# Patient Record
Sex: MALE | Race: BLACK OR AFRICAN AMERICAN | URBAN - METROPOLITAN AREA
[De-identification: names, ages, dates, MRNs, and addresses within clinical notes are randomized per-mention and may not be internally consistent; named-entity substitution may affect disease eponyms.]

---

## 2019-03-16 ENCOUNTER — ED HISTORICAL/CONVERTED ENCOUNTER (OUTPATIENT)
Dept: OTHER | Age: 34
End: 2019-03-16

## 2024-03-19 ENCOUNTER — HOSPITAL ENCOUNTER (INPATIENT)
Facility: HOSPITAL | Age: 39
LOS: 2 days | Discharge: HOME | DRG: 753 | End: 2024-03-21
Attending: EMERGENCY MEDICINE | Admitting: PSYCHIATRY & NEUROLOGY
Payer: COMMERCIAL

## 2024-03-19 DIAGNOSIS — F19.90 POLYSUBSTANCE USE DISORDER: ICD-10-CM

## 2024-03-19 DIAGNOSIS — F31.9 BIPOLAR DEPRESSION (HCC): Primary | ICD-10-CM

## 2024-03-19 PROBLEM — Z86.59 HISTORY OF BIPOLAR DISORDER: Status: ACTIVE | Noted: 2024-03-19

## 2024-03-19 LAB
ALBUMIN SERPL-MCNC: 3.7 G/DL (ref 3.5–5)
ALBUMIN/GLOB SERPL: 0.9 (ref 1.1–2.2)
ALP SERPL-CCNC: 77 U/L (ref 45–117)
ALT SERPL-CCNC: 42 U/L (ref 12–78)
AMPHET UR QL SCN: NEGATIVE
ANION GAP SERPL CALC-SCNC: 7 MMOL/L (ref 5–15)
APAP SERPL-MCNC: <2 UG/ML (ref 10–30)
AST SERPL-CCNC: 36 U/L (ref 15–37)
BARBITURATES UR QL SCN: NEGATIVE
BASOPHILS # BLD: 0 K/UL (ref 0–0.1)
BASOPHILS NFR BLD: 0 % (ref 0–1)
BENZODIAZ UR QL: NEGATIVE
BILIRUB SERPL-MCNC: 0.4 MG/DL (ref 0.2–1)
BUN SERPL-MCNC: 10 MG/DL (ref 6–20)
BUN/CREAT SERPL: 9 (ref 12–20)
CALCIUM SERPL-MCNC: 9 MG/DL (ref 8.5–10.1)
CANNABINOIDS UR QL SCN: POSITIVE
CHLORIDE SERPL-SCNC: 107 MMOL/L (ref 97–108)
CO2 SERPL-SCNC: 25 MMOL/L (ref 21–32)
COCAINE UR QL SCN: POSITIVE
CREAT SERPL-MCNC: 1.16 MG/DL (ref 0.7–1.3)
DIFFERENTIAL METHOD BLD: NORMAL
EKG ATRIAL RATE: 84 BPM
EKG DIAGNOSIS: NORMAL
EKG P AXIS: 72 DEGREES
EKG P-R INTERVAL: 180 MS
EKG Q-T INTERVAL: 368 MS
EKG QRS DURATION: 88 MS
EKG QTC CALCULATION (BAZETT): 434 MS
EKG R AXIS: 69 DEGREES
EKG T AXIS: 43 DEGREES
EKG VENTRICULAR RATE: 84 BPM
EOSINOPHIL # BLD: 0.1 K/UL (ref 0–0.4)
EOSINOPHIL NFR BLD: 1 % (ref 0–7)
ERYTHROCYTE [DISTWIDTH] IN BLOOD BY AUTOMATED COUNT: 12.7 % (ref 11.5–14.5)
ETHANOL SERPL-MCNC: 16 MG/DL (ref 0–0.08)
FLUAV RNA SPEC QL NAA+PROBE: NOT DETECTED
FLUBV RNA SPEC QL NAA+PROBE: NOT DETECTED
GLOBULIN SER CALC-MCNC: 3.9 G/DL (ref 2–4)
GLUCOSE SERPL-MCNC: 89 MG/DL (ref 65–100)
HCT VFR BLD AUTO: 44 % (ref 36.6–50.3)
HGB BLD-MCNC: 14.5 G/DL (ref 12.1–17)
IMM GRANULOCYTES # BLD AUTO: 0 K/UL (ref 0–0.04)
IMM GRANULOCYTES NFR BLD AUTO: 0 % (ref 0–0.5)
LYMPHOCYTES # BLD: 2 K/UL (ref 0.8–3.5)
LYMPHOCYTES NFR BLD: 37 % (ref 12–49)
Lab: ABNORMAL
MCH RBC QN AUTO: 28.3 PG (ref 26–34)
MCHC RBC AUTO-ENTMCNC: 33 G/DL (ref 30–36.5)
MCV RBC AUTO: 85.8 FL (ref 80–99)
METHADONE UR QL: NEGATIVE
MONOCYTES # BLD: 0.3 K/UL (ref 0–1)
MONOCYTES NFR BLD: 6 % (ref 5–13)
NEUTS SEG # BLD: 3.1 K/UL (ref 1.8–8)
NEUTS SEG NFR BLD: 56 % (ref 32–75)
NRBC # BLD: 0 K/UL (ref 0–0.01)
NRBC BLD-RTO: 0 PER 100 WBC
OPIATES UR QL: NEGATIVE
PCP UR QL: NEGATIVE
PLATELET # BLD AUTO: 274 K/UL (ref 150–400)
PMV BLD AUTO: 8.9 FL (ref 8.9–12.9)
POTASSIUM SERPL-SCNC: 3.5 MMOL/L (ref 3.5–5.1)
PROT SERPL-MCNC: 7.6 G/DL (ref 6.4–8.2)
RBC # BLD AUTO: 5.13 M/UL (ref 4.1–5.7)
SALICYLATES SERPL-MCNC: <1.7 MG/DL (ref 2.8–20)
SARS-COV-2 RNA RESP QL NAA+PROBE: NOT DETECTED
SODIUM SERPL-SCNC: 139 MMOL/L (ref 136–145)
SPECIMEN HOLD: NORMAL
WBC # BLD AUTO: 5.5 K/UL (ref 4.1–11.1)

## 2024-03-19 PROCEDURE — 99285 EMERGENCY DEPT VISIT HI MDM: CPT

## 2024-03-19 PROCEDURE — 80307 DRUG TEST PRSMV CHEM ANLYZR: CPT

## 2024-03-19 PROCEDURE — 93005 ELECTROCARDIOGRAM TRACING: CPT | Performed by: EMERGENCY MEDICINE

## 2024-03-19 PROCEDURE — 90791 PSYCH DIAGNOSTIC EVALUATION: CPT

## 2024-03-19 PROCEDURE — 80143 DRUG ASSAY ACETAMINOPHEN: CPT

## 2024-03-19 PROCEDURE — 80053 COMPREHEN METABOLIC PANEL: CPT

## 2024-03-19 PROCEDURE — 6370000000 HC RX 637 (ALT 250 FOR IP): Performed by: NURSE PRACTITIONER

## 2024-03-19 PROCEDURE — 87636 SARSCOV2 & INF A&B AMP PRB: CPT

## 2024-03-19 PROCEDURE — 85025 COMPLETE CBC W/AUTO DIFF WBC: CPT

## 2024-03-19 PROCEDURE — 82077 ASSAY SPEC XCP UR&BREATH IA: CPT

## 2024-03-19 PROCEDURE — 36415 COLL VENOUS BLD VENIPUNCTURE: CPT

## 2024-03-19 PROCEDURE — 1240000000 HC EMOTIONAL WELLNESS R&B

## 2024-03-19 PROCEDURE — 80179 DRUG ASSAY SALICYLATE: CPT

## 2024-03-19 RX ORDER — DIPHENHYDRAMINE HYDROCHLORIDE 50 MG/ML
50 INJECTION INTRAMUSCULAR; INTRAVENOUS EVERY 4 HOURS PRN
Status: DISCONTINUED | OUTPATIENT
Start: 2024-03-19 | End: 2024-03-21 | Stop reason: HOSPADM

## 2024-03-19 RX ORDER — SENNOSIDES A AND B 8.6 MG/1
1 TABLET, FILM COATED ORAL DAILY PRN
Status: DISCONTINUED | OUTPATIENT
Start: 2024-03-19 | End: 2024-03-21 | Stop reason: HOSPADM

## 2024-03-19 RX ORDER — HALOPERIDOL 5 MG/ML
5 INJECTION INTRAMUSCULAR EVERY 4 HOURS PRN
Status: DISCONTINUED | OUTPATIENT
Start: 2024-03-19 | End: 2024-03-21 | Stop reason: HOSPADM

## 2024-03-19 RX ORDER — MAGNESIUM HYDROXIDE/ALUMINUM HYDROXICE/SIMETHICONE 120; 1200; 1200 MG/30ML; MG/30ML; MG/30ML
30 SUSPENSION ORAL EVERY 6 HOURS PRN
Status: DISCONTINUED | OUTPATIENT
Start: 2024-03-19 | End: 2024-03-21 | Stop reason: HOSPADM

## 2024-03-19 RX ORDER — TRAZODONE HYDROCHLORIDE 50 MG/1
50 TABLET ORAL NIGHTLY PRN
Status: DISCONTINUED | OUTPATIENT
Start: 2024-03-19 | End: 2024-03-21 | Stop reason: HOSPADM

## 2024-03-19 RX ORDER — ACETAMINOPHEN 325 MG/1
650 TABLET ORAL EVERY 4 HOURS PRN
Status: DISCONTINUED | OUTPATIENT
Start: 2024-03-19 | End: 2024-03-21 | Stop reason: HOSPADM

## 2024-03-19 RX ORDER — HYDROXYZINE 50 MG/1
50 TABLET, FILM COATED ORAL 3 TIMES DAILY PRN
Status: DISCONTINUED | OUTPATIENT
Start: 2024-03-19 | End: 2024-03-21 | Stop reason: HOSPADM

## 2024-03-19 RX ORDER — POLYETHYLENE GLYCOL 3350 17 G/17G
17 POWDER, FOR SOLUTION ORAL DAILY PRN
Status: DISCONTINUED | OUTPATIENT
Start: 2024-03-19 | End: 2024-03-21 | Stop reason: HOSPADM

## 2024-03-19 RX ORDER — HALOPERIDOL 5 MG/1
5 TABLET ORAL EVERY 4 HOURS PRN
Status: DISCONTINUED | OUTPATIENT
Start: 2024-03-19 | End: 2024-03-21 | Stop reason: HOSPADM

## 2024-03-19 RX ADMIN — ALUMINUM HYDROXIDE, MAGNESIUM HYDROXIDE, AND SIMETHICONE 30 ML: 1200; 120; 1200 SUSPENSION ORAL at 21:25

## 2024-03-19 RX ADMIN — HYDROXYZINE HYDROCHLORIDE 50 MG: 50 TABLET, FILM COATED ORAL at 21:25

## 2024-03-19 RX ADMIN — ACETAMINOPHEN 650 MG: 325 TABLET ORAL at 23:28

## 2024-03-19 RX ADMIN — TRAZODONE HYDROCHLORIDE 50 MG: 50 TABLET ORAL at 21:25

## 2024-03-19 ASSESSMENT — SLEEP AND FATIGUE QUESTIONNAIRES
AVERAGE NUMBER OF SLEEP HOURS: 4
DO YOU USE A SLEEP AID: YES
DO YOU HAVE DIFFICULTY SLEEPING: YES

## 2024-03-19 ASSESSMENT — LIFESTYLE VARIABLES
HOW MANY STANDARD DRINKS CONTAINING ALCOHOL DO YOU HAVE ON A TYPICAL DAY: 3 OR 4
HOW OFTEN DO YOU HAVE A DRINK CONTAINING ALCOHOL: 2-4 TIMES A MONTH

## 2024-03-19 ASSESSMENT — PAIN DESCRIPTION - DESCRIPTORS: DESCRIPTORS: ACHING;DISCOMFORT;SORE

## 2024-03-19 ASSESSMENT — PAIN - FUNCTIONAL ASSESSMENT
PAIN_FUNCTIONAL_ASSESSMENT: NONE - DENIES PAIN
PAIN_FUNCTIONAL_ASSESSMENT: ACTIVITIES ARE NOT PREVENTED

## 2024-03-19 ASSESSMENT — PAIN DESCRIPTION - LOCATION: LOCATION: GENERALIZED

## 2024-03-19 ASSESSMENT — PAIN SCALES - GENERAL: PAINLEVEL_OUTOF10: 6

## 2024-03-19 NOTE — PLAN OF CARE
Problem: Depression  Goal: Will be euthymic at discharge  Description: INTERVENTIONS:  1. Administer medication as ordered  2. Provide emotional support via 1:1 interaction with staff  3. Encourage involvement in milieu/groups/activities  4. Monitor for social isolation  Outcome: Progressing    Pt acclimating to the unit, eating meal in small dining room, interacting with select peers. Pt making needs known. He had no scheduled medications 3-7P and needed no prns. Encouraged to seek staff with any questions or concerns.

## 2024-03-19 NOTE — BSMART NOTE
Comprehensive Assessment Form Part 1      Section I - Disposition    Bipolar Disorder per patient report   Mild Depression per patient report   PTSD per patient report    No past medical history on file.     The Medical Doctor to Psychiatrist conference was notcompleted.  The Medical Doctor is in agreement with Psychiatrist disposition because of (reason) pt reported SI with a plan, meets criteria for admission.  The plan is admit to inpatient BHU  The on-call Psychiatrist consulted was Dr. Dhillon.  The admitting Psychiatrist will be Dr. TERRY.  The admitting Diagnosis is Bipolar Disorder.  The Payor source is not on file    This writer reviewed the [place] Suicide Severity Rating Scale in nursing flowsheet and the risk level assigned is high risk.  Based on this assessment, the risk of suicide is high risk and the plan is to admit to inpatient BHU.    Section II - Integrated Summary  Summary:  Per triage,  \"Pt to ED via EMS c/o SI. Pt states he plans to cut his wrist to end his life. Pt also reported to this nurse during triage that he would like to be tested for HIV. \"       Patient is a 38 year old male that arrived to the emergency room for suicidal ideations. This writer met with patient face to face. Introduced self and role. Pt was alert and oriented x4. Presented with depressed mood, flat affect and poor eye contact. When clinician asked patient what brought him to the hospital, he stated, \"I am having suicidal thoughts.\" Pt reported SI for the past week, triggered by recent relapse. Pt shared he was clean for about 4 months, until the holidays. Pt reported this was a trigger for him due to having no family support and loneliness. Pt shared he is homeless and feels like a failure. Pt stated, \"I am tired of life. I don't feel like it is worth it.\" Pt reports feeling like it would be better without him. Pt reported current SI with intention and plan to overdose on sleeping medication or cut his wrist. Pt reported

## 2024-03-19 NOTE — ED NOTES
ED SIGN OUT NOTE  Care assumed at [facility] 8:11 AM EDT    Osmel Montanez is a 38 y.o. male awaiting placement in a psychiatric facility with a diagnosis of   1. Bipolar depression (HCC)    2. Polysubstance use disorder    . The patient was reexamined and remains clinically stable. All needs are being met at this time.     Pt admitted to  unit    Patient Vitals for the past 24 hrs:   BP Temp Temp src Pulse Resp SpO2   03/19/24 1113 (!) 124/96 97.3 °F (36.3 °C) Oral 89 16 98 %   03/19/24 1055 (!) 141/85 98.2 °F (36.8 °C) Oral 85 16 95 %   03/19/24 0357 (!) 148/77 97.6 °F (36.4 °C) Oral 78 18 97 %       MD Tamar Del Rio Ryland, MD  03/19/24 8585

## 2024-03-19 NOTE — BSMART NOTE
Patient has been accepted to [facility] room/bed 746/01 by STEFANO Prado on behalf of Dr. Oliveira. NELI Cavazos notified and to call report.     David Carrion LCSW

## 2024-03-19 NOTE — ED TRIAGE NOTES
Pt to ED via EMS c/o SI. Pt states he plans to cut his wrist to end his life. Pt also reported to this nurse during triage that he would like to be tested for HIV.

## 2024-03-19 NOTE — BSMART NOTE
BSMART assessment completed, and suicide risk level noted to be high risk. Primary Nurse NELI Cavazos and Charge Nurse NELI Mcnally and Physician Dr. Boyle notified. Concerns not observed.     David Carrion LCSW

## 2024-03-19 NOTE — BH NOTE
TRANSFER - IN REPORT:    Verbal report received from NELI Reeves on Osmel Montanez  being received from [facility] ED for routine progression of patient care      Report consisted of patient’s Situation, Background, Assessment and   Recommendations(SBAR).     Information from the following report(s) ED SBAR was reviewed with the receiving nurse.    Opportunity for questions and clarification was provided.      Assessment completed upon patient’s arrival to unit and care assume

## 2024-03-19 NOTE — ED PROVIDER NOTES
[facility] EMERGENCY DEP  EMERGENCY DEPARTMENT ENCOUNTER      Pt Name: Osmel Montanez  MRN: [id number]  Birthdate 1985  Date of evaluation: 3/19/2024  Provider: Maritza Chilel MD    CHIEF COMPLAINT       Chief Complaint   Patient presents with    Mental Health Problem         HISTORY OF PRESENT ILLNESS   (Location/Symptom, Timing/Onset, Context/Setting, Quality, Duration, Modifying Factors, Severity)  Note limiting factors.   Patient is a 38-year-old who presents to the emergency department with increased depressive symptoms over the last week.  He reports that he is not taking his prescribed medications and instead has been medicating himself with crack cocaine, marijuana, and alcohol.  He used all 3 today and had the urge to cut his wrists with a razor blade.  He did not injure himself today.    The history is provided by the patient.         Review of External Medical Records:     Nursing Notes were reviewed.    REVIEW OF SYSTEMS    (2-9 systems for level 4, 10 or more for level 5)     Review of Systems    Except as noted above the remainder of the review of systems was reviewed and negative.       PAST MEDICAL HISTORY   No past medical history on file.      SURGICAL HISTORY     No past surgical history on file.      CURRENT MEDICATIONS       Previous Medications    No medications on file       ALLERGIES     Patient has no known allergies.    FAMILY HISTORY     No family history on file.       SOCIAL HISTORY       Social History     Socioeconomic History    Marital status: Single           PHYSICAL EXAM    (up to 7 for level 4, 8 or more for level 5)     ED Triage Vitals [03/19/24 0357]   BP Temp Temp Source Pulse Respirations SpO2 Height Weight   (!) 148/77 97.6 °F (36.4 °C) Oral 78 18 97 % -- --       There is no height or weight on file to calculate BMI.    Physical Exam  Vitals and nursing note reviewed.   Constitutional:       Appearance: He is well-developed.   HENT:      Head: Normocephalic and atraumatic.

## 2024-03-19 NOTE — BH NOTE
Pt arrived at 1115 via ED staff and security  Pt is a volutary admission  Dual skin assesment was performed by NELI Black and JONI Soares   Assessment was WDL. Tonio score is 23  Pt did consent to safety while on the unit   Pt did sign consent forms up on arriving   Denies suicidal ideation   Denies homicidal ideation   Does not appear to be responding to internal stimuli   UDS was positive for cocaine and THC     Pt was calm and cooperative, gave minimal answers during admission process. Will continue to monitor and support q15min     Patient denies SI and HI at this time.

## 2024-03-19 NOTE — BH NOTE
GROUP THERAPY PROGRESS NOTE    Patient did not participate in Healthy Living group.    Katherine Gillies, MSW, [facility]

## 2024-03-20 LAB
EKG ATRIAL RATE: 87 BPM
EKG DIAGNOSIS: NORMAL
EKG P AXIS: 71 DEGREES
EKG P-R INTERVAL: 170 MS
EKG Q-T INTERVAL: 352 MS
EKG QRS DURATION: 86 MS
EKG QTC CALCULATION (BAZETT): 423 MS
EKG R AXIS: 67 DEGREES
EKG T AXIS: 44 DEGREES
EKG VENTRICULAR RATE: 87 BPM

## 2024-03-20 PROCEDURE — 1240000000 HC EMOTIONAL WELLNESS R&B

## 2024-03-20 PROCEDURE — 93005 ELECTROCARDIOGRAM TRACING: CPT | Performed by: NURSE PRACTITIONER

## 2024-03-20 PROCEDURE — 6370000000 HC RX 637 (ALT 250 FOR IP): Performed by: NURSE PRACTITIONER

## 2024-03-20 RX ORDER — LOPERAMIDE HYDROCHLORIDE 2 MG/1
2 CAPSULE ORAL 4 TIMES DAILY PRN
Status: DISCONTINUED | OUTPATIENT
Start: 2024-03-20 | End: 2024-03-21 | Stop reason: HOSPADM

## 2024-03-20 RX ORDER — SIMETHICONE 80 MG
80 TABLET,CHEWABLE ORAL EVERY 6 HOURS PRN
Status: DISCONTINUED | OUTPATIENT
Start: 2024-03-20 | End: 2024-03-21 | Stop reason: HOSPADM

## 2024-03-20 RX ORDER — BUPROPION HYDROCHLORIDE 150 MG/1
150 TABLET ORAL DAILY
Status: DISCONTINUED | OUTPATIENT
Start: 2024-03-20 | End: 2024-03-21 | Stop reason: HOSPADM

## 2024-03-20 RX ORDER — LAMOTRIGINE 25 MG/1
25 TABLET ORAL DAILY
Status: DISCONTINUED | OUTPATIENT
Start: 2024-03-20 | End: 2024-03-21 | Stop reason: HOSPADM

## 2024-03-20 RX ORDER — QUETIAPINE FUMARATE 25 MG/1
50 TABLET, FILM COATED ORAL NIGHTLY
Status: DISCONTINUED | OUTPATIENT
Start: 2024-03-20 | End: 2024-03-21 | Stop reason: HOSPADM

## 2024-03-20 RX ORDER — IBUPROFEN 600 MG/1
600 TABLET ORAL EVERY 6 HOURS PRN
Status: DISCONTINUED | OUTPATIENT
Start: 2024-03-20 | End: 2024-03-21 | Stop reason: HOSPADM

## 2024-03-20 RX ADMIN — HYDROXYZINE HYDROCHLORIDE 50 MG: 50 TABLET, FILM COATED ORAL at 21:26

## 2024-03-20 RX ADMIN — IBUPROFEN 600 MG: 600 TABLET, FILM COATED ORAL at 21:26

## 2024-03-20 RX ADMIN — QUETIAPINE FUMARATE 50 MG: 25 TABLET ORAL at 21:19

## 2024-03-20 RX ADMIN — ACETAMINOPHEN 650 MG: 325 TABLET ORAL at 09:15

## 2024-03-20 RX ADMIN — ALUMINUM HYDROXIDE, MAGNESIUM HYDROXIDE, AND SIMETHICONE 30 ML: 1200; 120; 1200 SUSPENSION ORAL at 11:48

## 2024-03-20 ASSESSMENT — PAIN DESCRIPTION - DESCRIPTORS: DESCRIPTORS: ACHING

## 2024-03-20 ASSESSMENT — PAIN - FUNCTIONAL ASSESSMENT
PAIN_FUNCTIONAL_ASSESSMENT: PREVENTS OR INTERFERES SOME ACTIVE ACTIVITIES AND ADLS
PAIN_FUNCTIONAL_ASSESSMENT: ACTIVITIES ARE NOT PREVENTED
PAIN_FUNCTIONAL_ASSESSMENT: NONE - DENIES PAIN

## 2024-03-20 ASSESSMENT — PAIN SCALES - WONG BAKER: WONGBAKER_NUMERICALRESPONSE: NO HURT

## 2024-03-20 ASSESSMENT — PAIN DESCRIPTION - LOCATION
LOCATION: BACK
LOCATION: BACK

## 2024-03-20 ASSESSMENT — PAIN SCALES - GENERAL
PAINLEVEL_OUTOF10: 8
PAINLEVEL_OUTOF10: 4
PAINLEVEL_OUTOF10: 6
PAINLEVEL_OUTOF10: 0

## 2024-03-20 ASSESSMENT — PAIN DESCRIPTION - ORIENTATION: ORIENTATION: LOWER

## 2024-03-20 NOTE — BH NOTE
PRN Medication Documentation    Specific patient behavior that led to need for PRN medication: Pt c/o of anxiety  Staff interventions attempted prior to PRN being given: dim lights, rest  PRN medication given: Atarax 50 mg PO  Patient response/effectiveness of PRN medication: tl aware    Additionally, trazodone 50 mg PO given to promote sleep.

## 2024-03-20 NOTE — H&P
[facility] BEHAVIORAL HEALTH  INITIAL PSYCHIATRIC INTERVIEW:    Name: Osmel Montanez  MR#: [id number]  ACCOUNT#: [id number]  : 1985  ADMIT DATE: 3/19/2024    CHIEF COMPLAINT: \"I'm super depressed.\"     HISTORY OF PRESENTING COMPLAINT:  This is a 38 y.o. male who is currently admitted to the general psychiatric floor at [facility] on a voluntary basis for suicidal ideation with thoughts of cutting his wrists or overdosing. He is homeless and has been smoking THC and crack cocaine daily, as well as drinking 2-3 beers nightly.   Since arriving to the unit, the pt has exhibited agitation, has refused to participate in treatment team, has been argumentative with staff. He reports he's been struggling with depression over the past week. He identifies a relapse as well as homelessness as reasons for his admission. He was recently discharged from [facility] but did not follow up with any recommended care. He reports he normally takes Wellbutrin XL 300mg, Lamictal 50mg daily, and Seroquel, but has been off those for about a week.     PAST PSYCHIATRIC HISTORY: The pt has a hx of 8-10 psychiatric hospitalizations, mostly for depression, most recently at [facility] in . The pt has a hx of one past suicide attempt by cutting his wrist in . He is not currently connected with a therapist or psychiatrist.     PAST MEDICATION TRIALS: Lamictal, Buspar, hydroxyzine, trazodone, Wellbutrin, clonidine     SUBSTANCE ABUSE HISTORY:  Crack cocaine - 1-2 g daily   Etoh - 3-4 beers daily   THC daily     PSYCHOSOCIAL HISTORY: The pt is currently homeless. He is single and has no children. He has no access to firearms. Denies pending legal issues. Not working currently.     FAMILY HISTORY:   None that the pt is aware of     PAST MEDICAL HISTORY:   History reviewed. No pertinent past medical history.    ALLERGIES: NKDA    MENTAL STATUS EXAM:   38 y.o. male in moderate grooming, dressed in hospital gown,

## 2024-03-20 NOTE — INTERDISCIPLINARY ROUNDS
Behavioral Health Interdisciplinary Rounds     Patient Name: Osmel Montanez  Age: 38 y.o.  Room/Bed:  [facility]  Primary Diagnosis: History of bipolar disorder   Admission Status: Voluntary    Readmission within 30 days: No  Power of  in place: No  Patient requires a blocked bed: No          Reason for blocked bed:   Sleep hours:        Participation in Care/Groups:  New admit  Medication Compliant?: Yes  PRNS (last 24 hours): Antianxiety and Pain   Restraints (last 24 hours):  No  __________________________________________________  OQ Admission Analysis Survey completed:  OQ Admission Analysis Survey score:  __________________________________________________     Alcohol screening (AUDIT) completed -     If applicable, date SBIRT discussed in treatment team AND documented:    Tobacco - patient is a smoker:    Illegal Drugs use:      24 hour chart check complete: Yes    _______________________________________________    Patient goal(s) for today:   Treatment team focus/goals:   Progress note:      Spiritual Care Consult:   Financial concerns/prescription coverage:    Family contact:                        Family requesting physician contact today:    Discharge plan:   Access to weapons :                                                              Outpatient provider(s):   Patient's preferred phone number for follow up call :   Patient's preferred e-mail address :    LOS:  1  Expected LOS:     Participating treatment team members: Osmel Montanez, * (assigned SW),

## 2024-03-20 NOTE — PROGRESS NOTES
Laboratory Monitoring for Antipsychotics:    This patient is currently prescribed the following medication(s):   Current Facility-Administered Medications: buPROPion (WELLBUTRIN XL) extended release tablet 150 mg, 150 mg, Oral, Daily  lamoTRIgine (LAMICTAL) tablet 25 mg, 25 mg, Oral, Daily  QUEtiapine (SEROQUEL) tablet 50 mg, 50 mg, Oral, Nightly  ibuprofen (ADVIL;MOTRIN) tablet 600 mg, 600 mg, Oral, Q6H PRN  simethicone (MYLICON) chewable tablet 80 mg, 80 mg, Oral, Q6H PRN  loperamide (IMODIUM) capsule 2 mg, 2 mg, Oral, 4x Daily PRN  acetaminophen (TYLENOL) tablet 650 mg, 650 mg, Oral, Q4H PRN  polyethylene glycol (GLYCOLAX) packet 17 g, 17 g, Oral, Daily PRN  senna (SENOKOT) tablet 8.6 mg, 1 tablet, Oral, Daily PRN  aluminum & magnesium hydroxide-simethicone (MAALOX) 200-200-20 MG/5ML suspension 30 mL, 30 mL, Oral, Q6H PRN  hydrOXYzine HCl (ATARAX) tablet 50 mg, 50 mg, Oral, TID PRN  haloperidol (HALDOL) tablet 5 mg, 5 mg, Oral, Q4H PRN **OR** haloperidol lactate (HALDOL) injection 5 mg, 5 mg, IntraMUSCular, Q4H PRN  diphenhydrAMINE (BENADRYL) injection 50 mg, 50 mg, IntraMUSCular, Q4H PRN  traZODone (DESYREL) tablet 50 mg, 50 mg, Oral, Nightly PRN    The following labs have been completed for monitoring of antipsychotics and/or mood stabilizers:    Height, Weight, BMI Estimation  There is no height or weight on file to calculate BMI.     Vital Signs/Blood Pressure  /76   Pulse (!) 101   Temp 99.1 °F (37.3 °C) (Oral)   Resp 20   SpO2 98%     Renal Function, Hepatic Function and Chemistry  CrCl cannot be calculated (Unknown ideal weight.).    Lab Results   Component Value Date/Time     03/19/2024 04:15 AM    K 3.5 03/19/2024 04:15 AM     03/19/2024 04:15 AM    CO2 25 03/19/2024 04:15 AM    BUN 10 03/19/2024 04:15 AM    GLOB 3.9 03/19/2024 04:15 AM    ALT 42 03/19/2024 04:15 AM    AST 36 03/19/2024 04:15 AM       Glucose/Hemoglobin A1c  No results found for: \"GLU\", \"GLUCPOC\"  No results found

## 2024-03-20 NOTE — BH NOTE
GROUP THERAPY PROGRESS NOTE  Activity: Meditation   Pt did not participate in group led by nursing students.  Katherine Gillies, MSW, [facility]

## 2024-03-20 NOTE — BH NOTE
PSYCHOSOCIAL ASSESSMENT  :Patient identifying info:   Osmel Montanez is a 38 y.o., male admitted 3/19/2024  3:54 AM     Presenting problem and precipitating factors: Pt arrived to ED for SI triggered by recent relapse. Reports HI with no plan to \"someone in the streets.\" Hx of mild depression, reports he has been off of medication for a week. Pt seeing admission for SI with thoughts of cutting wrists or overdosing.    Mental status assessment: Overall mood is depressed and agitated    Strengths/Recreation/Coping Skills: Insured    Collateral information: None    Current psychiatric /substance abuse providers and contact info: None at this time    Previous psychiatric/substance abuse providers and response to treatment: Hx of 8-10 psych hospitalizations, mostly for depression. Most recently at [facility] in early     Family history of mental illness or substance abuse: None pt is aware of    Substance abuse history:    Social History     Tobacco Use    Smoking status: Not on file    Smokeless tobacco: Not on file   Substance Use Topics    Alcohol use: Not on file       History of biomedical complications associated with substance abuse: Not noted    Patient's current acceptance of treatment or motivation for change: Voluntary    Family constellation: Single, no children    Is significant other involved? No    Describe support system: Limited    Describe living arrangements and home environment: No stable housing, homeless    GUARDIAN/POA: No    Guardian Name:      Guardian Contact:      Health issues: History reviewed. No pertinent past medical history.    Trauma history: Not noted    Legal issues: None    History of  service: None    Financial status: Not noted    Voodoo/cultural factors: Not noted    Education/work history: Highest education achieved is GED, currently not employed    Have you been licensed as a health care professional (current or ): No    Describe coping skills:

## 2024-03-20 NOTE — CARE COORDINATION
03/20/24 1449   ITP   Date of Plan 03/20/24   Date of Next Review 03/27/24   Primary Diagnosis Code Bipolar   Barriers to Treatment Need for psychoeducation;Psychiatric symptom (comment)   Strengths Incorporated in Plan Acknowledging need for assistance   Plan of Care   Long Term Goal (LTG) Stated in patient/guardian terms Prticipate in outpt tx 12 wks   Short Term Goal 1   Short Term Goal 1 Client will be oriented to program and staff, and participate in assessment process   Baseline Functioning able to verbalize needs   Target 1 week   Objectives Client will participate in group therapy   Intervention 1 Acknowledge client strengths   Frequency daily   Measured by Staff observation;Self report;Behavioral data   Staff Responsible Clinical staff   Intervention 2 Group therapy   Frequency daily   Measured by Staff observation;Self report   Staff Responsible Clinical staff   Intervention 3 Monitor medications   Frequency daily   Measured by Staff observation;Behavioral data   Staff Responsible Clinical staff   STG Goal 1 Status: Patient Appears to be  Progressing toward treatment plan goal   Crisis/Safety/Discharge Plan   Crisis/Safety Plan Standard program interventions and protocol   Comprehensive Assessment Completion Date 03/20/24   Discharge Plan Pt to discharge home with follow up care

## 2024-03-20 NOTE — PLAN OF CARE
Problem: Depression  Goal: Will be euthymic at discharge  Description: INTERVENTIONS:  1. Administer medication as ordered  2. Provide emotional support via 1:1 interaction with staff  3. Encourage involvement in milieu/groups/activities  4. Monitor for social isolation  3/20/2024 0152 by Marino Mckeon, RN  Outcome: Progressing     Problem: Drug Abuse/Detox  Goal: Will have no detox symptoms and will verbalize plan for changing drug-related behavior  Description: INTERVENTIONS:  1. Administer medication as ordered  2. Monitor physical status  3. Provide emotional support with 1:1 interaction with staff  4. Encourage  recovery focused treatment   Outcome: Progressing

## 2024-03-20 NOTE — DISCHARGE INSTRUCTIONS
DISCHARGE SUMMARY    NAME:Osmel Montanez  : 1985  MRN: [id number]    The patient Osmel Montanez exhibits the ability to control behavior in a less restrictive environment.  Patient's level of functioning is improving.  No assaultive/destructive behavior has been observed for the past 24 hours.  No suicidal/homicidal threat or behavior has been observed for the past 24 hours.  There is no evidence of serious medication side effects.  Patient has not been in physical or protective restraints for at least the past 24 hours.    If weapons involved, how are they secured? None    Is patient aware of and in agreement with discharge plan? Yes    Arrangements for medication:  Prescriptions filled through [facility], 30 day supply provided    Copy of discharge instructions to provider?: Yes    Arrangements for transportation home: U     Keep all follow up appointments as scheduled, continue to take prescribed medications per physician instructions.  Mental health crisis number:  911 or your local mental health crisis line number at [facility] at [phone]      Mental Health Emergency WARM LINE      [phone] ([phone])      M-F: 9am to 9pm      Sat & Sun: 5pm - 9pm  National suicide prevention lines:                             9-575-RKTLGRY (1-601.625.4983)       1-776-313-TALK (1-351.177.6836)    Crisis Text Line:  Text HOME to 665771      STD TESTING INFORMATION     [facility] [place] [address] [place] [address]   Call the [facility] Call Center at [phone] any time between 8 am and 5 pm Monday through Friday to make a testing appointment!     Free HIV/STI Testing  [facility] ([address] ) offers free HIV, gonorrhea, and chlamydia testing.  You do not have to be a [facility] clinic patient to request testing.  Appointments can be made by email or phone ([phone] x [phone]).  [facility] ([address], [address], [phone]) offers free HIV,

## 2024-03-20 NOTE — PLAN OF CARE
Problem: Depression  Goal: Will be euthymic at discharge  Description: INTERVENTIONS:  1. Administer medication as ordered  2. Provide emotional support via 1:1 interaction with staff  3. Encourage involvement in milieu/groups/activities  4. Monitor for social isolation  3/20/2024 1615 by Gianna Eaton, LPN  Outcome: Progressing  Will continue to observe  for increased sx of depression

## 2024-03-20 NOTE — PLAN OF CARE
Problem: Depression  Goal: Will be euthymic at discharge  Description: INTERVENTIONS:  1. Administer medication as ordered  2. Provide emotional support via 1:1 interaction with staff  3. Encourage involvement in milieu/groups/activities  4. Monitor for social isolation  3/20/2024 0758 by Yuridia Concepcion RN  Outcome: Progressing  3/20/2024 0152 by Marino Mckeon RN  Outcome: Progressing     Problem: Drug Abuse/Detox  Goal: Will have no detox symptoms and will verbalize plan for changing drug-related behavior  Description: INTERVENTIONS:  1. Administer medication as ordered  2. Monitor physical status  3. Provide emotional support with 1:1 interaction with staff  4. Encourage  recovery focused treatment   3/20/2024 0758 by Yuridia Concepcion RN  Outcome: Progressing  3/20/2024 0152 by Marino Mckeon RN  Outcome: Progressing    1240: Patient is participatory in treatment team. Mood and affect; demanding, irritable, and labile. Patient needing frequent redirection on being appropriate and respectful while on the unit. Patient voices passive SI. Denies HI. Denies AH/VH. Plans are to restart medications patient was previously on. Patient has history of multiple psychiatric admissions/malingering. Plans are ongoing, patient voices no further complaints at this time.

## 2024-03-20 NOTE — BH NOTE
GROUP THERAPY PROGRESS NOTE    Patient did not participate in recreational therapy group.    Katherine Gillies, MSW, [facility]

## 2024-03-20 NOTE — BH NOTE
Patient was in room lying in bed, this writer asked patient twice within 5 mins to come to med room to take his medications.  Patient refused.

## 2024-03-21 VITALS
RESPIRATION RATE: 18 BRPM | HEART RATE: 101 BPM | TEMPERATURE: 97.9 F | SYSTOLIC BLOOD PRESSURE: 99 MMHG | DIASTOLIC BLOOD PRESSURE: 88 MMHG | OXYGEN SATURATION: 97 %

## 2024-03-21 PROCEDURE — 6370000000 HC RX 637 (ALT 250 FOR IP): Performed by: NURSE PRACTITIONER

## 2024-03-21 RX ORDER — TRAZODONE HYDROCHLORIDE 50 MG/1
50 TABLET ORAL NIGHTLY PRN
Qty: 30 TABLET | Refills: 0 | Status: SHIPPED | OUTPATIENT
Start: 2024-03-21

## 2024-03-21 RX ORDER — LAMOTRIGINE 25 MG/1
25 TABLET ORAL DAILY
Qty: 30 TABLET | Refills: 0 | Status: SHIPPED | OUTPATIENT
Start: 2024-03-22

## 2024-03-21 RX ORDER — BUPROPION HYDROCHLORIDE 150 MG/1
150 TABLET ORAL DAILY
Qty: 30 TABLET | Refills: 0 | Status: SHIPPED | OUTPATIENT
Start: 2024-03-22

## 2024-03-21 RX ORDER — HYDROXYZINE 50 MG/1
50 TABLET, FILM COATED ORAL 3 TIMES DAILY PRN
Qty: 30 TABLET | Refills: 0 | Status: SHIPPED | OUTPATIENT
Start: 2024-03-21 | End: 2024-03-31

## 2024-03-21 RX ORDER — QUETIAPINE FUMARATE 50 MG/1
50 TABLET, FILM COATED ORAL NIGHTLY
Qty: 60 TABLET | Refills: 0 | Status: SHIPPED | OUTPATIENT
Start: 2024-03-21

## 2024-03-21 RX ADMIN — BUPROPION HYDROCHLORIDE 150 MG: 150 TABLET, EXTENDED RELEASE ORAL at 08:54

## 2024-03-21 RX ADMIN — LAMOTRIGINE 25 MG: 25 TABLET ORAL at 08:54

## 2024-03-21 ASSESSMENT — PAIN SCALES - GENERAL: PAINLEVEL_OUTOF10: 0

## 2024-03-21 ASSESSMENT — PAIN SCALES - WONG BAKER: WONGBAKER_NUMERICALRESPONSE: NO HURT

## 2024-03-21 NOTE — BH NOTE
PRN Medication Documentation    Specific patient behavior that led to need for PRN medication: Pt c/o of anxiety  Staff interventions attempted prior to PRN being given: rest, coping skills   PRN medication given: Atarax 50 mg PO  Patient response/effectiveness of PRN medication: tl aware

## 2024-03-21 NOTE — BH NOTE
Behavioral Health Transition Record to Provider    Patient Name: Osmel Montanez  YOB: 1985   Medical Record Number: [id number]  Date of Admission: 3/19/2024  3:54 AM   Date of Discharge: 3/21/2024    Attending Provider: Alexi Oliveira MD   Discharging Provider: Chloe Prado NP    To contact this individual call [phone] and ask the  to page.  If unavailable, ask to be transferred to Behavioral Health Provider on call.  A Behavioral Health Provider will be available on call 24/7 and during holidays.    Primary Care Provider: No primary care provider on file.    No Known Allergies    Reason for Admission: CHIEF COMPLAINT: \"I'm super depressed.\"      HISTORY OF PRESENTING COMPLAINT:  This is a 38 y.o. male who is currently admitted to the general psychiatric floor at [facility] on a voluntary basis for suicidal ideation with thoughts of cutting his wrists or overdosing. He is homeless and has been smoking THC and crack cocaine daily, as well as drinking 2-3 beers nightly.   Since arriving to the unit, the pt has exhibited agitation, has refused to participate in treatment team, has been argumentative with staff. He reports he's been struggling with depression over the past week. He identifies a relapse as well as homelessness as reasons for his admission. He was recently discharged from [facility] but did not follow up with any recommended care. He reports he normally takes Wellbutrin XL 300mg, Lamictal 50mg daily, and Seroquel, but has been off those for about a week.     Admission Diagnosis: History of bipolar disorder [Z86.59]    * No surgery found *    Results for orders placed or performed during the hospital encounter of 03/19/24   COVID-19 & Influenza Combo    Specimen: Nasopharyngeal   Result Value Ref Range    SARS-CoV-2, PCR Not detected NOTD      Rapid Influenza A By PCR Not detected NOTD      Rapid Influenza B By PCR Not detected NOTD     Urine Culture Hold Sample

## 2024-03-21 NOTE — PLAN OF CARE
Problem: Safety - Adult  Goal: Free from fall injury  Outcome: Progressing     Pt appears to be resting in bed in no apparent distress, respirations even and unlabored. No voiced concerns. Standard fall precautions maintained. Q15m rounds for safety continued per provider order.

## 2024-03-21 NOTE — PLAN OF CARE
Problem: Discharge Planning  Goal: Discharge to home or other facility with appropriate resources  Outcome: Progressing  Flowsheets (Taken 3/21/2024 1013)  Discharge to home or other facility with appropriate resources:   Identify barriers to discharge with patient and caregiver   Arrange for needed discharge resources and transportation as appropriate   Identify discharge learning needs (meds, wound care, etc)   Refer to discharge planning if patient needs post-hospital services based on physician order or complex needs related to functional status, cognitive ability or social support system

## 2024-03-21 NOTE — BH NOTE
[facility]  PSYCHIATRIC PROGRESS NOTE    Patient: Osmel Montanez MRN: [id number]  SSN: [id number]    YOB: 1985  Age: 38 y.o.  Sex: male      Admit Date: 3/19/2024    Length of Stay: 2 Days    Legal Status: Voluntary    Chief Complaint: \"I'm ready to go.\"     Interval History:   3/21/24- Osmel is doing well today. He is feeling ready to discharge and feels good being back on his medications. He is future oriented and is looking forward to admitting to [facility] and continuing his mental health and substance use treatment outpatient. Pt denies SI/plan/intent, denies HI/plan/intent, denies AVH, no evidence of any psychotic processes observed. No complaints reported with eating or sleeping. Denies other changes or new concerns or questions at this time.     Vitals:  Patient Vitals for the past 24 hrs:   Temp Pulse Resp BP SpO2   03/21/24 1104 -- -- 18 -- --   03/21/24 0859 97.9 °F (36.6 °C) (!) 101 18 99/88 97 %     Labs:  Lab Results   Component Value Date/Time    WBC 5.5 03/19/2024 04:15 AM    HGB 14.5 03/19/2024 04:15 AM    HCT 44.0 03/19/2024 04:15 AM     03/19/2024 04:15 AM    MCV 85.8 03/19/2024 04:15 AM      Lab Results   Component Value Date/Time     03/19/2024 04:15 AM    K 3.5 03/19/2024 04:15 AM     03/19/2024 04:15 AM    CO2 25 03/19/2024 04:15 AM    BUN 10 03/19/2024 04:15 AM    GLOB 3.9 03/19/2024 04:15 AM    ALT 42 03/19/2024 04:15 AM      Scheduled Meds:   buPROPion  150 mg Oral Daily    lamoTRIgine  25 mg Oral Daily    QUEtiapine  50 mg Oral Nightly     PRN Meds:  ibuprofen, simethicone, loperamide, acetaminophen, polyethylene glycol, senna, aluminum & magnesium hydroxide-simethicone, hydrOXYzine HCl, haloperidol **OR** haloperidol lactate, diphenhydrAMINE, traZODone    Mental Status Exam:  38 y.o. male in moderate grooming, dressed in hospital gown, well engaged in evaluation.   Makes fair eye contact.  Psychomotor activity is WNL, no adventitious movements  Speech is

## 2024-03-21 NOTE — DISCHARGE SUMMARY
DISCHARGE SUMMARY  Some parts of the discharge summary are from the initial Psychiatric interview that was done on admission by the admitting psychiatrist.     Name: Osmel Montanez  MR#: [id number]  Account#: [id number]  : 1985  Date of Admission: 3/19/2024    Date of Discharge: 3/21/2024     TYPE OF DISCHARGE:   REGULAR     INITIAL PSYCHIATRIC INTERVIEW:   CHIEF COMPLAINT: \"I'm super depressed.\"      HISTORY OF PRESENTING COMPLAINT:  This is a 38 y.o. male who is currently admitted to the general psychiatric floor at [facility] on a voluntary basis for suicidal ideation with thoughts of cutting his wrists or overdosing. He is homeless and has been smoking THC and crack cocaine daily, as well as drinking 2-3 beers nightly.   Since arriving to the unit, the pt has exhibited agitation, has refused to participate in treatment team, has been argumentative with staff. He reports he's been struggling with depression over the past week. He identifies a relapse as well as homelessness as reasons for his admission. He was recently discharged from [facility] but did not follow up with any recommended care. He reports he normally takes Wellbutrin XL 300mg, Lamictal 50mg daily, and Seroquel, but has been off those for about a week.      PAST PSYCHIATRIC HISTORY: The pt has a hx of 8-10 psychiatric hospitalizations, mostly for depression, most recently at [facility] in . The pt has a hx of one past suicide attempt by cutting his wrist in . He is not currently connected with a therapist or psychiatrist.      PAST MEDICATION TRIALS: Lamictal, Buspar, hydroxyzine, trazodone, Wellbutrin, clonidine      SUBSTANCE ABUSE HISTORY:  Crack cocaine - 1-2 g daily   Etoh - 3-4 beers daily   THC daily      PSYCHOSOCIAL HISTORY: The pt is currently homeless. He is single and has no children. He has no access to firearms. Denies pending legal issues. Not working currently.      FAMILY HISTORY:   None that the pt is

## 2024-03-21 NOTE — INTERDISCIPLINARY ROUNDS
Behavioral Health Interdisciplinary Rounds     Patient Name: Osmel Montanez          Age: 38 y.o.          Room/Bed:  746/  Primary Diagnosis: History of bipolar disorder  Admission Status: Voluntary  Patient requires a blocked bed: No          Reason for blocked bed: N/A      Flu Vaccine :   Consults: None  Labs/Testing due today?: No          Have they refused labs?: N/A    Sleep hours:   Participation in Care/Groups: No  Medication Compliant?: Selective  PRNS (last 24 hours):  Tylenol, Maalox, Atarax, Ibuprofen  Restraints (last 24 hours): No     CIWA (range last 24 hours): CIWA-Ar Total: 1  COWS (range last 24 hours):      ________________________________________________________________  OQ Admission Analysis Survey completed:  OQ Admission Analysis Survey score:  _____________________________________________________________________    Alcohol screening (AUDIT) completed -         If applicable, date SBIRT discussed in treatment team AND documented:   AUDIT Screen Score:        Document Brief Intervention (corresponds directly with the 5 A's, Ask, Advise, Assess, Assist, and Arrange):     At- Risk Patients (Score 7-15 for women; 8-15 for men)  Discuss concern patient is drinking at unhealthy levels known to increase risk of alcohol-related health problems.    Is Patient ready to commit to change?     If No:  Encourage reflection  Discuss short term and long term health risks of consuming alcohol  Barriers to change  Reaffirm willingness to help / Educational materials provided  If Yes:  Set goal  Plan  Educational materials provided    Harmful use or Dependence (Score 16 or greater)  Discuss short term and long term health risks of consuming alcohol  Recommendations  Negotiate drinking goal  Recommend addiction specialist/center  Arrange follow-up appointments.    Tobacco - patient is a smoker:    Illegal Drugs use:      24 hour chart check complete:

## 2024-03-21 NOTE — BH NOTE
GROUP THERAPY PROGRESS NOTE    Patient did not participate in psychoeducation group.    Katherine Gillies MSW, [facility]